# Patient Record
Sex: MALE | Race: WHITE | NOT HISPANIC OR LATINO
[De-identification: names, ages, dates, MRNs, and addresses within clinical notes are randomized per-mention and may not be internally consistent; named-entity substitution may affect disease eponyms.]

---

## 2020-09-14 PROBLEM — Z00.00 ENCOUNTER FOR PREVENTIVE HEALTH EXAMINATION: Status: ACTIVE | Noted: 2020-09-14

## 2020-09-15 ENCOUNTER — RESULT REVIEW (OUTPATIENT)
Age: 25
End: 2020-09-15

## 2020-09-15 ENCOUNTER — OUTPATIENT (OUTPATIENT)
Dept: OUTPATIENT SERVICES | Facility: HOSPITAL | Age: 25
LOS: 1 days | End: 2020-09-15
Payer: COMMERCIAL

## 2020-09-15 ENCOUNTER — APPOINTMENT (OUTPATIENT)
Dept: ORTHOPEDIC SURGERY | Facility: CLINIC | Age: 25
End: 2020-09-15
Payer: COMMERCIAL

## 2020-09-15 VITALS
DIASTOLIC BLOOD PRESSURE: 86 MMHG | SYSTOLIC BLOOD PRESSURE: 136 MMHG | WEIGHT: 188 LBS | HEART RATE: 76 BPM | OXYGEN SATURATION: 98 % | BODY MASS INDEX: 25.47 KG/M2 | HEIGHT: 72 IN

## 2020-09-15 DIAGNOSIS — S43.422A SPRAIN OF LEFT ROTATOR CUFF CAPSULE, INITIAL ENCOUNTER: ICD-10-CM

## 2020-09-15 DIAGNOSIS — M22.2X1 PATELLOFEMORAL DISORDERS, RIGHT KNEE: ICD-10-CM

## 2020-09-15 DIAGNOSIS — M75.41 IMPINGEMENT SYNDROME OF RIGHT SHOULDER: ICD-10-CM

## 2020-09-15 DIAGNOSIS — Z78.9 OTHER SPECIFIED HEALTH STATUS: ICD-10-CM

## 2020-09-15 PROCEDURE — 73030 X-RAY EXAM OF SHOULDER: CPT | Mod: 26,50

## 2020-09-15 PROCEDURE — 73030 X-RAY EXAM OF SHOULDER: CPT

## 2020-09-15 PROCEDURE — 73564 X-RAY EXAM KNEE 4 OR MORE: CPT | Mod: 26,50

## 2020-09-15 PROCEDURE — 99204 OFFICE O/P NEW MOD 45 MIN: CPT

## 2020-09-15 PROCEDURE — 73564 X-RAY EXAM KNEE 4 OR MORE: CPT

## 2020-09-15 NOTE — PHYSICAL EXAM
[de-identified] : General: Well-nourished, well-developed, alert, and in no acute distress.\par Head: Normocephalic.\par Eyes: Pupils equal round reactive to light and accommodation, extraocular muscles intact, normal sclera.\par Nose: No nasal discharge.\par Cardiac: Regular rate. Extremities are warm and well perfused. Distal pulses are symmetric bilaterally.\par Respiratory: No labored breathing.\par Extremities: Sensation is intact distally bilaterally.  Distal pulses are symmetric bilaterally\par Lymphatic: No regional lymphadenopathy, no lymphedema\par Neurologic: No focal deficits\par Skin: Normal skin color, texture, and turgor\par Psychiatric: Normal affect\par MSK: as noted above/below\par \par \par \par RIGHT SHOULDER:\par  \par Inspection:no bruising, rash, erythema, deformity\par Joint Effusion:no\par ROM:normal Forward Flexion, Abduction , Internal Rotation: , External Rotation \par Palpation: NO AC joint pain, Bicipital Groove Pain , GH joint pain , Clavicle pain , GT pain \par Distal Pulses:normal\par Upper Extremity Reflexes:2+\par Shoulder Strength: 5/5 \par Upper Extremity Sensation: normal\par \par Special Tests:\par Empty Can: Negative \par Lift-Off Test: Negative \par O'Briens: Negative\par Cross Arm: Negative\par Neer: Negative\par Casey: POSITIVE\par Speed: Negative\par Apprehension:Negative\par \par LEFT SHOULDER:\par  \par Inspection:no bruising, rash, erythema, deformity\par Joint Effusion:no\par ROM:normal Forward Flexion, Abduction , Internal Rotation: , External Rotation \par Palpation: MILD PAIN AT SUPRASPINATUS FOOTPRINT, MILD POSTERIOR GH JOINT LINE PAIN, NO AC joint pain, Bicipital Groove Pain , , Clavicle pain , GT pain \par Distal Pulses:normal\par Upper Extremity Reflexes:2+\par Shoulder Strength: 5/5 \par Upper Extremity Sensation: normal\par \par Special Tests:\par Empty Can: Negative \par Lift-Off Test: Negative \par O'Briens: POSITIVE\par Cross Arm: POSITIVE\par Neer: Negative\par Casey: POSITIVE\par Speed: Negative\par Apprehension:Negative\par \par  [de-identified] : Xray BILATERAL Shoulder - Multiple views were reviewed with the patient in detail.  There is no acute fracture or dislocation.  There is no joint effusion.  Joint spaces are preserved.  We will await formal radiology reading.\par \par Xray Bilateral Knees - Multiple views were reviewed with the patient in detail.  There is no acute fracture or dislocation.  There is no joint effusion.  Joint spaces are preserved.  We will await formal radiology reading.\par \par

## 2020-09-15 NOTE — REVIEW OF SYSTEMS
[Negative] : Endocrine [Joint Pain] : joint pain [Joint Swelling] : joint swelling [Joint Stiffness] : joint stiffness

## 2020-09-15 NOTE — HISTORY OF PRESENT ILLNESS
[de-identified] : The patient is a 24 year old, RHD man presenting with bilateral shoulder pain and right knee pain.\par \par The patient presents with a one week history of left superolateral shoulder pain.  He was indoor snowboarding about one week ago and had a mechanical fall with his arm partially abducted.  He feels like his arm slightly subluxed.  He did not require assisted reduction.  He denies history of shoulder instability or joint laxity.  He had some initial soreness and stiffness which has nearly resolved.  He endorses an occasional clicking sensation.  He also presents with a several week history of right shoulder pain after hyperflexing doing a workout on an ab roller.  His symptoms have nearly resolved.\par \par He also complains of chronic, right anterior knee pain.  He has been training for a potential marathon, and has been running ~15 miles per week.  He did not increase his mileage preciptously.  The patient denies significant swelling or bruising.  The patient denies mechanical symptoms including catching, locking, buckling.  Pain is worse after runs.  \par \par Pain is rated 3/10, described as throbbing in shoulder, improved with rest, worse with reaching activities. [3] : a current pain level of 3/10

## 2020-09-15 NOTE — DISCUSSION/SUMMARY
[Medication Risks Reviewed] : Medication risks reviewed [de-identified] : The patient is a 24 year old, RHD man presenting with a one week history of acute left shoulder pain after a snowboarding fall.  His pain is likely secondary to rotator cuff capsule sprain.  He may have some labral pathology.\par \par He also complains of more chronic right shoulder pain likely secondary to impingement syndrome.\par \par He also complains of chronic, right anterior knee pain likely secondary to patellofemoral pain syndrome.\par \par Imaging/Diagnostics were interpreted and results were reviewed with the patient in detail.  All questions were answered appropriately.\par \par The patient was counseled on the natural progression of the problem today.  The patient was provided reassurance today.\par \par The patient was also provided some general home exercises.  The patient was counseled on activity modification.  He was given a list of shoulder exercises and hip/knee/core exercises.  We discussed crosstraining, taking rest days, and appropriate hydration.\par \par We discussed patella taping, i.e. [place] taping for right knee.\par \par He may try home exercise program for 2 weeks, and I extended a formal PT if he desires at that time.\par \par If symptoms persist, consider MRI.\par \par ------------------------------------------------------------------------------------------------------------------\par Patient appreciates and agrees with current plan.\par \par This note was generated using a mixture of manual typing and dragon medical dictation software.  A reasonable effort has been made for proofreading its contents, but typos may still remain.  If there are any questions or points of clarification needed please notify my office.\par \par >45 minutes of time was spent on total encounter.  >50% of the visit was spent on counseling/coordination of care and medical-decision making for this patient.\par \par \par